# Patient Record
Sex: FEMALE | Race: WHITE | NOT HISPANIC OR LATINO
[De-identification: names, ages, dates, MRNs, and addresses within clinical notes are randomized per-mention and may not be internally consistent; named-entity substitution may affect disease eponyms.]

---

## 2017-02-09 ENCOUNTER — RECORDS - HEALTHEAST (OUTPATIENT)
Dept: ADMINISTRATIVE | Facility: OTHER | Age: 32
End: 2017-02-09

## 2017-09-20 ENCOUNTER — RECORDS - HEALTHEAST (OUTPATIENT)
Dept: ADMINISTRATIVE | Facility: OTHER | Age: 32
End: 2017-09-20

## 2018-08-31 ENCOUNTER — OFFICE VISIT - HEALTHEAST (OUTPATIENT)
Dept: FAMILY MEDICINE | Facility: CLINIC | Age: 33
End: 2018-08-31

## 2018-08-31 ENCOUNTER — RECORDS - HEALTHEAST (OUTPATIENT)
Dept: ADMINISTRATIVE | Facility: OTHER | Age: 33
End: 2018-08-31

## 2018-08-31 DIAGNOSIS — Z13.1 SCREENING FOR DIABETES MELLITUS: ICD-10-CM

## 2018-08-31 DIAGNOSIS — Z00.00 ROUTINE HEALTH MAINTENANCE: ICD-10-CM

## 2018-08-31 DIAGNOSIS — Z13.220 SCREENING FOR LIPID DISORDERS: ICD-10-CM

## 2018-08-31 DIAGNOSIS — D22.9 CHANGING NEVUS: ICD-10-CM

## 2018-08-31 DIAGNOSIS — F41.9 ANXIETY: ICD-10-CM

## 2018-08-31 ASSESSMENT — MIFFLIN-ST. JEOR: SCORE: 1156.73

## 2018-09-04 ENCOUNTER — AMBULATORY - HEALTHEAST (OUTPATIENT)
Dept: LAB | Facility: CLINIC | Age: 33
End: 2018-09-04

## 2018-09-04 ENCOUNTER — COMMUNICATION - HEALTHEAST (OUTPATIENT)
Dept: TELEHEALTH | Facility: CLINIC | Age: 33
End: 2018-09-04

## 2018-09-04 DIAGNOSIS — Z13.220 SCREENING FOR LIPID DISORDERS: ICD-10-CM

## 2018-09-04 DIAGNOSIS — Z00.00 ROUTINE HEALTH MAINTENANCE: ICD-10-CM

## 2018-09-04 DIAGNOSIS — Z13.1 SCREENING FOR DIABETES MELLITUS: ICD-10-CM

## 2018-09-04 LAB
ANION GAP SERPL CALCULATED.3IONS-SCNC: 9 MMOL/L (ref 5–18)
BUN SERPL-MCNC: 14 MG/DL (ref 8–22)
CALCIUM SERPL-MCNC: 9.7 MG/DL (ref 8.5–10.5)
CHLORIDE BLD-SCNC: 107 MMOL/L (ref 98–107)
CHOLEST SERPL-MCNC: 197 MG/DL
CO2 SERPL-SCNC: 26 MMOL/L (ref 22–31)
CREAT SERPL-MCNC: 0.79 MG/DL (ref 0.6–1.1)
FASTING STATUS PATIENT QL REPORTED: YES
GFR SERPL CREATININE-BSD FRML MDRD: >60 ML/MIN/1.73M2
GLUCOSE BLD-MCNC: 88 MG/DL (ref 70–125)
HDLC SERPL-MCNC: 71 MG/DL
LDLC SERPL CALC-MCNC: 97 MG/DL
POTASSIUM BLD-SCNC: 4 MMOL/L (ref 3.5–5)
SODIUM SERPL-SCNC: 142 MMOL/L (ref 136–145)
TRIGL SERPL-MCNC: 144 MG/DL

## 2018-09-05 LAB
25(OH)D3 SERPL-MCNC: 47.3 NG/ML (ref 30–80)
25(OH)D3 SERPL-MCNC: 47.3 NG/ML (ref 30–80)

## 2018-09-07 ENCOUNTER — COMMUNICATION - HEALTHEAST (OUTPATIENT)
Dept: FAMILY MEDICINE | Facility: CLINIC | Age: 33
End: 2018-09-07

## 2018-09-26 ENCOUNTER — COMMUNICATION - HEALTHEAST (OUTPATIENT)
Dept: FAMILY MEDICINE | Facility: CLINIC | Age: 33
End: 2018-09-26

## 2018-09-27 ENCOUNTER — COMMUNICATION - HEALTHEAST (OUTPATIENT)
Dept: FAMILY MEDICINE | Facility: CLINIC | Age: 33
End: 2018-09-27

## 2018-09-27 DIAGNOSIS — L81.8 ATYPICAL MELANOCYTIC HYPERPLASIA: ICD-10-CM

## 2018-10-03 LAB
LAB AP CHARGES (HE HISTORICAL CONVERSION): NORMAL
PATH REPORT.ADDENDUM SPEC: NORMAL
PATH REPORT.COMMENTS IMP SPEC: NORMAL
PATH REPORT.FINAL DX SPEC: NORMAL
PATH REPORT.GROSS SPEC: NORMAL
PATH REPORT.MICROSCOPIC SPEC OTHER STN: NORMAL
PATH REPORT.RELEVANT HX SPEC: NORMAL
RESULT FLAG (HE HISTORICAL CONVERSION): NORMAL

## 2019-01-15 ENCOUNTER — RECORDS - HEALTHEAST (OUTPATIENT)
Dept: ADMINISTRATIVE | Facility: OTHER | Age: 34
End: 2019-01-15

## 2019-02-25 ENCOUNTER — COMMUNICATION - HEALTHEAST (OUTPATIENT)
Dept: FAMILY MEDICINE | Facility: CLINIC | Age: 34
End: 2019-02-25

## 2019-08-20 ENCOUNTER — COMMUNICATION - HEALTHEAST (OUTPATIENT)
Dept: FAMILY MEDICINE | Facility: CLINIC | Age: 34
End: 2019-08-20

## 2019-08-20 DIAGNOSIS — F41.9 ANXIETY: ICD-10-CM

## 2019-08-21 RX ORDER — DULOXETIN HYDROCHLORIDE 30 MG/1
60 CAPSULE, DELAYED RELEASE ORAL DAILY
Qty: 180 CAPSULE | Refills: 0 | Status: SHIPPED | OUTPATIENT
Start: 2019-08-21

## 2019-08-30 ENCOUNTER — OFFICE VISIT - HEALTHEAST (OUTPATIENT)
Dept: FAMILY MEDICINE | Facility: CLINIC | Age: 34
End: 2019-08-30

## 2019-08-30 DIAGNOSIS — Z00.00 ROUTINE HEALTH MAINTENANCE: ICD-10-CM

## 2019-08-30 DIAGNOSIS — Z31.69 ENCOUNTER FOR PRECONCEPTION CONSULTATION: ICD-10-CM

## 2019-08-30 DIAGNOSIS — F41.9 ANXIETY: ICD-10-CM

## 2019-08-30 ASSESSMENT — MIFFLIN-ST. JEOR: SCORE: 1183.49

## 2020-01-15 ENCOUNTER — COMMUNICATION - HEALTHEAST (OUTPATIENT)
Dept: FAMILY MEDICINE | Facility: CLINIC | Age: 35
End: 2020-01-15

## 2021-05-31 NOTE — PROGRESS NOTES
FEMALE PREVENTATIVE EXAM    Assessment and Plan:       1. Routine health maintenance  Healthy female exam     2. Anxiety  Well controlled.  Discussed benefits and risk of continuing with Cymbalta with pregnancy.  She would like to trial weaning off the medication.  Recommend taking dose every-other-day x 1 week.  Then every 3 days x 1 week, then every 4 x 1 week, then stop.  She may slow the wean at any point if she is feeling nauseated or dizzy.  Will need to monitor her anxiety before, during, and after pregnancy.  She would be willing to restart the Cymbalta if her anxiety becomes a problem.    3. Encounter for preconception consultation  Discussed generalized advice for conception.  Recommend stopping her spironolactone right now.  Recommend tracking menstrual cycles.  I would anticipate that they will normalize 3-6 months after stopping her Nuva Ring.  Discussed options for prenatal care if/when she becomes pregnant.     Next follow up:  Return in about 1 year (around 8/30/2020) for Physical.    Immunization Review  Adult Imm Review: No immunizations due today    I discussed the following with the patient:   Adult Healthy Living: Importance of regular exercise  Healthy nutrition      Subjective:   Chief Complaint: Telma Villagran is an 33 y.o. female here for a preventative health visit.     HPI:  Patient is  with no children.  Works as  PT at [employer].    Recently stopped her Nuva ring, as her and  would like to conceive.  She has had some light spotting and mild pelvic pain.  No full period yet.  Previously had a Mirena and used OCPs.  She is on Spironolactone for some hormonal acne, and wondering when she should stop this.    On Cymbalta for anxiety.  She has a lot of anxiety regarding food intake and has a large fear of vomiting.  In the last year, she has been able to put on some weight and has been trying new foods.  She feels less consumed with this stressor.  Overall, anxiety is very well  "controlled.  Wondering if she needs to stop her Cymbalta during pregnancy.  She would like to try weaning off.  Denies any depressive symptoms.  Seeing her therapist infrequently.    Patient is not fasting today.  We decided to forgo fasting labs this year, as there are no new concerning findings.         Healthy Habits  Are you taking a daily aspirin? No  Do you typically exercising at least 40 min, 3-4 times per week?  NO  Do you usually eat at least 4 servings of fruit and vegetables a day, include whole grains and fiber and avoid regularly eating high fat foods? NO  Have you had an eye exam in the past two years? NO  Do you see a dentist twice per year? Yes  Do you have any concerns regarding sleep? No    Safety Screen  If you own firearms, are they secured in a locked gun cabinet or with trigger locks? Yes  Do you feel you are safe where you are living?: Yes (8/30/2019  1:57 PM)  Do you feel you are safe in your relationship(s)?: Yes (8/30/2019  1:57 PM)      Review of Systems:  Please see above.  The rest of the review of systems are negative for all systems.     Pap History:   Yes - updated in Problem List and Health Maintenance accordingly  Cancer Screening       Status Date      PAP SMEAR Next Due 9/3/2020      Done 9/3/2015      Patient has more history with this topic...          Patient Care Team:  Aurelia Shelton NP as PCP - General (Family Medicine)        History     Reviewed By Date/Time Sections Reviewed    Aurelia Shelton NP 8/30/2019  2:01 PM Tobacco    Shiela Baltazar MA 8/30/2019  1:57 PM Tobacco            Objective:   Vital Signs:   Visit Vitals  /62   Pulse 88   Temp 98  F (36.7  C)   Ht 5' 4\" (1.626 m)   Wt 111 lb (50.3 kg)   LMP 08/04/2019 (Exact Date)   BMI 19.05 kg/m           PHYSICAL EXAM  General Appearance: Alert, cooperative, no distress, appears stated age  Head: Normocephalic, without obvious abnormality, atraumatic  Eyes: PERRL, conjunctiva/corneas clear, EOM's " intact  Ears: Normal TM's and external ear canals, both ears  Nose: Nares normal, septum midline,mucosa normal, no drainage  Throat: Lips, mucosa, and tongue normal; teeth and gums normal  Neck: Supple, symmetrical, trachea midline, no adenopathy;  thyroid: not enlarged, symmetric, no tenderness/mass/nodules; no carotid bruit or JVD  Back: Symmetric, no curvature, ROM normal, no CVA tenderness  Lungs: Clear to auscultation bilaterally, respirations unlabored  Breasts: No breast masses, tenderness, asymmetry, or nipple discharge.  Heart: Regular rate and rhythm, S1 and S2 normal, no murmur, rub, or gallop  Abdomen: Soft, non-tender, bowel sounds active all four quadrants,  no masses, no organomegaly  Extremities: Extremities normal, atraumatic, no cyanosis or edema  Skin: Skin color, texture, turgor normal, no rashes or lesions  Lymph nodes: Cervical, supraclavicular, and axillary nodes normal  Neurologic: Normal   Psychologic: appropriate affective, answers all of my questions appropriately. No hallucinations, delusion, or suicidal ideations.    PHQ-9 Total Score: 3 (8/30/2019  2:00 PM)    KAI 7 Total Score: 2 (8/30/2019  2:00 PM)      Aurelia Shelton NP

## 2021-05-31 NOTE — TELEPHONE ENCOUNTER
Please let patient know that I refilled her Cymbalta x 3 months, but she is due for an office visit prior to any additional refills.    Aurelia Shelton NP

## 2021-05-31 NOTE — TELEPHONE ENCOUNTER
Due to be seen    Rx renewed per Medication Renewal Policy. Medication was last renewed on 2/26/19.    Veronica Kelsey, [facility] Triage/Med Refill 8/21/2019     Requested Prescriptions   Pending Prescriptions Disp Refills     DULoxetine (CYMBALTA) 30 MG capsule [Pharmacy Med Name: DULOXETINE HCL DR 30 MG CAP] 180 capsule 1     Sig: TAKE 2 CAPSULES (60 MG TOTAL) BY MOUTH DAILY.       Tricyclics/Misc Antidepressant/Antianxiety Meds Refill Protocol Passed - 8/20/2019  1:27 AM        Passed - PCP or prescribing provider visit in last year     Last office visit with prescriber/PCP: Visit date not found OR same dept: Visit date not found OR same specialty: Visit date not found  Last physical: 8/31/2018 Last MTM visit: Visit date not found   Next visit within 3 mo: Visit date not found  Next physical within 3 mo: Visit date not found  Prescriber OR PCP: Aurelia Shelton NP  Last diagnosis associated with med order: There are no diagnoses linked to this encounter.  If protocol passes may refill for 12 months if within 3 months of last provider visit (or a total of 15 months).

## 2021-06-01 VITALS — HEIGHT: 64 IN | WEIGHT: 105.1 LBS | BODY MASS INDEX: 17.94 KG/M2

## 2021-06-03 VITALS — HEIGHT: 64 IN | BODY MASS INDEX: 18.95 KG/M2 | WEIGHT: 111 LBS

## 2021-06-16 PROBLEM — N87.1 CERVICAL INTRAEPITHELIAL NEOPLASIA GRADE 2: Status: ACTIVE | Noted: 2018-08-31

## 2021-06-16 PROBLEM — Z98.890 S/P LEEP (LOOP ELECTROSURGICAL EXCISION PROCEDURE): Status: ACTIVE | Noted: 2018-08-31

## 2021-06-17 NOTE — PATIENT INSTRUCTIONS - HE
Patient Instructions by Aurelia Shelton NP at 8/30/2019  2:00 PM     Author: Aurelia Shelton NP Service: -- Author Type: Nurse Practitioner    Filed: 8/30/2019  2:26 PM Encounter Date: 8/30/2019 Status: Signed    : Aurelia Shelton NP (Nurse Practitioner)       Patient Education     Prevention Guidelines, Women Ages 18 to 39  Screening tests and vaccines are an important part of managing your health. A screening test is done to find possible disorders or diseases in people who don't have any symptoms. The goal is to find a disease early so lifestyle changes can be made and you can be watched more closely to reduce the risk of disease, or to detect it early enough to treat it most effectively. Screening tests are not considered diagnostic, but are used to determine if more testing is needed. Health counseling is essential, too. Below are guidelines for these, for women ages 18 to 39. Talk with your healthcare provider to make sure youre up-to-date on what you need.  Screening Who needs it How often   Alcohol misuse All women in this age group At routine exams   Blood pressure All women in this age group Yearly checkup if your blood pressure is normal  Normal blood pressure is less than 120/80 mm Hg  If your blood pressure reading is higher than normal, follow the advice of your healthcare provider   Breast cancer All women in this age group should talk with their healthcare providers about the need for clinical breast exams (CBE)1 Clinical breast exam every 3 years1   Cervical cancer Women ages 21 and older Women between ages 21 and 29 should have a Pap test every 3 years; women between ages 30 and 65 are advised to have a Pap test plus an HPV test every 5 years   Chlamydia Sexually active women ages 24 and younger, and women at increased risk for infection Every 3 years if you're at risk or have symptoms   Depression All women in this age group At routine exams   Type 2 diabates, prediabetes All women  with no symptoms who are overweight or obese and have 1 or more other risk factors for diabetes At least every 3 years. Also, testing for diabetes during pregnancy after the 24th week.    Type 2 diabetes, prediabetes All women diagnosed with gestational diabetes Lifelong testing every 3 years   Type 2 diabetes All women with prediabetes Every year   Gonorrhea Sexually active women at increased risk for infection At routine exams   Hepatitis C Anyone at increased risk At routine exams   HIV All women should be tested at least once for HIV between the ages of 13 and 64 At routine exams. Those with risk factors for HIV should be tested at least annually.    Obesity All women in this age group At routine exams   Syphilis Women at increased risk for infection should talk with their healthcare provider At routine exams   Tuberculosis Women at increased risk for infection should talk with their healthcare provider Ask your healthcare provider   Vision All women in this age group At least 1 complete exam in your 20s, and 2 in your 30s   Vaccine2 Who needs it How often   Chickenpox (varicella) All women in this age group who have no record of this infection or vaccine 2 doses; the second dose should be given 4 to 8 weeks after the first dose   Hepatitis A Women at increased risk for infection should talk with their healthcare provider 2 doses given at least 6 months apart   Hepatitis B Women at increased risk for infection should talk with their healthcare provider 3 doses over 6 months; second dose should be given 1 month after the first dose; the third dose should be given at least 2 months after the second dose and at least 4 months after the first dose   Haemophilus influenzae Type B (HIB) Women at increased risk for infection should talk with their healthcare provider 1 to 3 doses   Human papillomavirus (HPV) All women in this age group up to age 26 3 doses; the second dose should be given 1 to 2 months after the first  dose and the third dose given 6 months after the first dose   Influenza (flu) All women in this age group Once a year   Measles, mumps, rubella (MMR) All women in this age group who have no record of these infections or vaccines 1 or 2 doses   Meningococcal Women at increased risk for infection should talk with their healthcare provider 1 or more doses   Pneumococcal conjugate vaccine (PCV13) and pneumococcal polysaccharide vaccine (PPSV23) Women at increased risk for infection should talk with their healthcare provider PCV13: 1 dose ages 19 to 65 (protects against 13 types of pneumococcal bacteria)  PPSV23: 1 to 2 doses through age 64, or 1 dose at 65 or older (protects against 23 types of pneumococcal bacteria)      Tetanus/diphtheria/pertussis (Td/Tdap) booster All women in this age group Td every 10 years, or a one-time dose of Tdap instead of a Td booster after age 18, then Td every 10 years   Counseling Who needs it How often   BRCA gene mutation testing for breast and ovarian cancer susceptibility Women with increased risk for having gene mutation When your risk is known   Breast cancer and chemoprevention Women at high risk for breast cancer When your risk is known   Diet and exercise Women who are overweight or obese When diagnosed, and then at routine exams   Domestic violence Women at the age in which they are able to have children At routine exams   Sexually transmitted infection prevention Women who are sexually active At routine exams   Skin cancer Prevention of skin cancer in fair-skinned adults At routine exams   Use of tobacco and the health effects it can cause All women in this age group Every visit   1According to the ACS, women ages 20 to 39 years should have a clinical breast exam (CBE) as part of their routine health exam every 3 years. Breast self-exams are an option for women starting in their 20s. But the USPSTF does not recommend CBE.  2Those who are 18 years old and not up-to-date on  their childhood vaccines should get all appropriate catch-up vaccines recommended by the CDC.  Date Last Reviewed: 10/1/2017    2201-3801 [employer]. [address] [place] [address], [place] [address]. All rights reserved. This information is not intended as a substitute for professional medical care. Always follow your healthcare professional's instructions.

## 2021-06-20 NOTE — PROGRESS NOTES
Telma Villagran is a 32 y.o. female who presents for a mole removal    Procedure Note    Pre-operative Diagnosis: Nevus    Indications: Changing skin lesion    Procedure Details   The risks (including infection, bleeding, pain) and benefits of the procedure were explained to the patient and verbal informed consent was obtained.      Lesion was prepped and anesthetized with 1% lidocaine.  Lesion was cleansed with Betadine ×3.  A shave biopsy tool was used to remove the lesion.  Hemostasis achieved with electrocautery.  A dry Band-Aid was placed.  Lesion was sent for pathology.    Condition:  Stable    Complications:  None    Plan:  The patient was advised to call for any fever or for prolonged or severe pain or bleeding. She was advised to use OTC ibuprofen as needed for mild to moderate pain.     Aurelia Shelton

## 2021-06-20 NOTE — PROGRESS NOTES
FEMALE PREVENTATIVE EXAM    Assessment and Plan:       1. Routine health maintenance  Healthy female exam  - Vitamin D, Total (25-Hydroxy); Future    2. Screening for diabetes mellitus  - Basic Metabolic Panel; Future    3. Screening for lipid disorders  - Lipid [place] FASTING; Future    4. Changing nevus  See procedure note  - Surgical Pathology Exam    5. Anxiety  Controlled on Cymbalta.  Discussed recommendations for weaning off if she decides to do this.      Next follow up:  Return in about 1 year (around 8/31/2019).    Immunization Review  Adult Imm Review: Declines immunizations today    I discussed the following with the patient:   Adult Healthy Living: Importance of regular exercise  Healthy nutrition  Getting adequate sleep  Stress management    I have had an Advance Directives discussion with the patient.    Subjective:   Chief Complaint: Telma Villagran is an 32 y.o. female here for a preventative health visit.     HPI: Patient is .  She works as a physical therapist at [employer].    History of anxiety, depression, and extreme phobia of vomiting.  She is currently on Cymbalta, 30 mg once daily.  Anxiety is very well controlled.  She did also have a significant amount of anxiety with the passing of her father within the last year.  She worked with a counselor extensively due to her phobia.  She is thinking about weaning off the Cymbalta due to the possibility of pregnancy in the future.      Currently using the NuvaRing for contraception, and is working well for her.    Mom has a history of breast cancer.  Patient is doing regular breast exams.    Patient has a changing mole on her right thigh that she would like removed.  It is not bothersome, but has gotten a lot darker in the last year.  She has had other skin lesions removed in the past; none have been cancerous.    Healthy Habits  Are you taking a daily aspirin? No  Do you typically exercising at least 40 min, 3-4 times per week?  " NO  Do you usually eat at least 4 servings of fruit and vegetables a day, include whole grains and fiber and avoid regularly eating high fat foods? NO  Have you had an eye exam in the past two years? NO  Do you see a dentist twice per year? NO  Do you have any concerns regarding sleep? No    Safety Screen  If you own firearms, are they secured in a locked gun cabinet or with trigger locks? Yes  Do you feel you are safe where you are living?: Yes (8/31/2018  1:40 PM)  Do you feel you are safe in your relationship(s)?: Yes (8/31/2018  1:40 PM)    Review of Systems:  Please see above.  The rest of the review of systems are negative for all systems.     Pap History:   Yes - KAL 2/3 on biopsy - PAP/HPV co-testing at 12, 24 months.  If two negative results repeat co-testing in 3 years, if negative then routine screening   Cancer Screening       Status Date      PAP SMEAR Next Due 9/3/2020      Done 9/3/2015           Patient Care Team:  Aurelia Shelton NP as PCP - General (Family Medicine)        History     Reviewed By Date/Time Sections Reviewed    Aurelia Shelton NP 8/31/2018  1:51 PM Tobacco    Katheryn Mallory MA 8/31/2018  1:41 PM Medical, Surgical, Tobacco, Alcohol, Drug Use, Sexual Activity, Family    Katheryn Mallory MA 8/31/2018  1:40 PM Medical            Objective:   Vital Signs:   Visit Vitals     /72 (Patient Site: Right Arm, Patient Position: Sitting, Cuff Size: Adult Regular)     Pulse 68     Ht 5' 4\" (1.626 m)     Wt 105 lb 1.6 oz (47.7 kg)     LMP 08/24/2018 (Exact Date)     Breastfeeding No     BMI 18.04 kg/m2          PHYSICAL EXAM  General Appearance: Alert, cooperative, no distress, appears stated age  Head: Normocephalic, without obvious abnormality, atraumatic  Eyes: PERRL, conjunctiva/corneas clear, EOM's intact  Ears: Normal TM's and external ear canals, both ears  Throat: Lips, mucosa, and tongue normal; teeth and gums normal  Neck: Supple, symmetrical, trachea midline, no " adenopathy;  thyroid: not enlarged, symmetric, no tenderness/mass/nodules  Lungs: Clear to auscultation bilaterally, respirations unlabored  Breasts: No breast masses, tenderness, asymmetry, or nipple discharge.  Heart: Regular rate and rhythm, S1 and S2 normal, no murmur, rub, or gallop  Abdomen: Soft, non-tender, bowel sounds active all four quadrants,  no masses, no organomegaly  Extremities: Extremities normal, atraumatic, no cyanosis or edema  Skin: Skin color, texture, turgor normal, no rashes or lesions. 4 mm dark, papular nevus to anterior right thigh. No drainage or flaking.   Lymph nodes: Cervical, supraclavicular, and axillary nodes normal  Neurologic: Normal   Psychologic: appropriate affective, answers all of my questions appropriately. No hallucinations, delusion, or suicidal ideations.    Aurelia Shelton, NP

## 2021-06-27 ENCOUNTER — HEALTH MAINTENANCE LETTER (OUTPATIENT)
Age: 36
End: 2021-06-27

## 2021-10-16 ENCOUNTER — HEALTH MAINTENANCE LETTER (OUTPATIENT)
Age: 36
End: 2021-10-16

## 2022-07-23 ENCOUNTER — HEALTH MAINTENANCE LETTER (OUTPATIENT)
Age: 37
End: 2022-07-23

## 2022-10-01 ENCOUNTER — HEALTH MAINTENANCE LETTER (OUTPATIENT)
Age: 37
End: 2022-10-01

## 2023-08-06 ENCOUNTER — HEALTH MAINTENANCE LETTER (OUTPATIENT)
Age: 38
End: 2023-08-06